# Patient Record
Sex: FEMALE | Race: WHITE | Employment: UNEMPLOYED | URBAN - METROPOLITAN AREA
[De-identification: names, ages, dates, MRNs, and addresses within clinical notes are randomized per-mention and may not be internally consistent; named-entity substitution may affect disease eponyms.]

---

## 2017-01-13 ENCOUNTER — TELEPHONE (OUTPATIENT)
Dept: FAMILY MEDICINE | Facility: CLINIC | Age: 57
End: 2017-01-13

## 2017-01-13 NOTE — TELEPHONE ENCOUNTER
"Vincent calling to request a Troponin and blood sugar labs. States, \"I am so worried about my heart because I have been shoveling and i have back pain and last night I was a little nauseated and my shoulder is acting up. I just need these labs because of my anxiety. Ask Dr Coronel to put the lab work in for me , he knows me.\"    When I tried to explain to the patient that this is thekind of labwork that is done stat in the ED she contributed her nausea to her hiatal hernia, her shoulder pain to tendonitis and her back pain to overdoing it shoveling. I let her know we send labs out to be resulted and that we would not have the results until Monday. The patient said, \"That's ok, at least I know they were done and I don't have to worry.\"  When I explained that the purpose of a Troponin was to see if there was a cardiac event patient replied, \"Do you think I could do jumping jacks if I was having a heart attack?\" I then asked the patient what she would need the Troponin then for and she said \"For peace of mind.\"    Please call the patient . Glendy Davis RN  "

## 2017-01-13 NOTE — TELEPHONE ENCOUNTER
Called patient. No symptoms during exercise. Reassurred. Will call next week if ongoing issues and can possibly see at [place] on Tuesday or [place] on Thursday.

## 2017-02-21 ENCOUNTER — OFFICE VISIT (OUTPATIENT)
Dept: FAMILY MEDICINE | Facility: CLINIC | Age: 57
End: 2017-02-21
Payer: COMMERCIAL

## 2017-02-21 ENCOUNTER — TELEPHONE (OUTPATIENT)
Dept: FAMILY MEDICINE | Facility: CLINIC | Age: 57
End: 2017-02-21

## 2017-02-21 VITALS — TEMPERATURE: 98.3 F

## 2017-02-21 DIAGNOSIS — R35.0 URINARY FREQUENCY: Primary | ICD-10-CM

## 2017-02-21 DIAGNOSIS — R39.89 URINARY PROBLEM: Primary | ICD-10-CM

## 2017-02-21 DIAGNOSIS — R82.90 NONSPECIFIC FINDING ON EXAMINATION OF URINE: ICD-10-CM

## 2017-02-21 DIAGNOSIS — O24.419 GESTATIONAL DIABETES MELLITUS: ICD-10-CM

## 2017-02-21 LAB
ALBUMIN UR-MCNC: NEGATIVE MG/DL
APPEARANCE UR: CLEAR
BACTERIA #/AREA URNS HPF: ABNORMAL /HPF
BILIRUB UR QL STRIP: NEGATIVE
COLOR UR AUTO: YELLOW
GLUCOSE UR STRIP-MCNC: NEGATIVE MG/DL
HGB UR QL STRIP: NEGATIVE
KETONES UR STRIP-MCNC: NEGATIVE MG/DL
LEUKOCYTE ESTERASE UR QL STRIP: ABNORMAL
NITRATE UR QL: NEGATIVE
NON-SQ EPI CELLS #/AREA URNS LPF: ABNORMAL /LPF
PH UR STRIP: 6 PH (ref 5–7)
RBC #/AREA URNS AUTO: ABNORMAL /HPF (ref 0–2)
SP GR UR STRIP: 1.02 (ref 1–1.03)
URN SPEC COLLECT METH UR: ABNORMAL
UROBILINOGEN UR STRIP-ACNC: 0.2 EU/DL (ref 0.2–1)
WBC #/AREA URNS AUTO: ABNORMAL /HPF (ref 0–2)

## 2017-02-21 PROCEDURE — 81001 URINALYSIS AUTO W/SCOPE: CPT | Performed by: FAMILY MEDICINE

## 2017-02-21 PROCEDURE — 87086 URINE CULTURE/COLONY COUNT: CPT | Performed by: FAMILY MEDICINE

## 2017-02-21 PROCEDURE — 99207 ZZC NO CHARGE NURSE ONLY: CPT

## 2017-02-21 NOTE — NURSING NOTE
"Initial Temp 98.3  F (36.8  C) (Tympanic)  LMP 04/05/2013 Estimated body mass index is 28.96 kg/(m^2) as calculated from the following:    Height as of 10/14/16: 5' 5\" (1.651 m).    Weight as of 10/14/16: 174 lb (78.9 kg). .    "

## 2017-02-21 NOTE — TELEPHONE ENCOUNTER
Patient called and want to come in to drop off urine sample, she has been up all night urinating and has discomfort in her lower left side.    Ann-Marie Osuna [facility]

## 2017-02-21 NOTE — TELEPHONE ENCOUNTER
Patient calling with dysuria and frequency. She would like a UA done. She would also like a diabetes check and someone to look at her ear. Does not want her B/P checked due to anxiety.    The patient will come in for a UA and wants to be called with the results. She would like a prescription sent to [facility] [place] if positive. Glendy Davis RN

## 2017-02-21 NOTE — TELEPHONE ENCOUNTER
Can do this although, if the blood sugar is high enough to cause urinary frequency, it will be seen in the urine analysis... And it wasn't.

## 2017-02-21 NOTE — MR AVS SNAPSHOT
After Visit Summary   2/21/2017    Vincent Nelson    MRN: [id number]           Patient Information     Date Of Birth          1960        Visit Information        Provider Department      2/21/2017 10:30 AM [facility], Fl [facility]        Today's Diagnoses     Urinary problem    -  1    Nonspecific finding on examination of urine           Follow-ups after your visit        Who to contact     Normal or non-critical lab and imaging results will be communicated to you by Deedhart, letter or phone within 4 business days after the clinic has received the results. If you do not hear from us within 7 days, please contact the clinic through Deedhart or phone. If you have a critical or abnormal lab result, we will notify you by phone as soon as possible.  Submit refill requests through Kymab or call your pharmacy and they will forward the refill request to us. Please allow 3 business days for your refill to be completed.          If you need to speak with a  for additional information , please call: [phone]           Additional Information About Your Visit        DeedharBidModo Information     Kymab gives you secure access to your electronic health record. If you see a primary care provider, you can also send messages to your care team and make appointments. If you have questions, please call your primary care clinic.  If you do not have a primary care provider, please call [phone] and they will assist you.        Care EveryWhere ID     This is your Care EveryWhere ID. This could be used by other organizations to access your Standard medical records  [id number]        Your Vitals Were     Temperature Last Period                98.3  F (36.8  C) (Tympanic) 04/05/2013           Blood Pressure from Last 3 Encounters:   10/14/16 128/71   09/20/16 127/75   05/06/14 118/75    Weight from Last 3 Encounters:   10/14/16 174 lb (78.9 kg)   09/20/16 178 lb (80.7 kg)   05/06/14 175  lb (79.4 kg)              We Performed the Following     *UA reflex to Microscopic and Culture ([facility] (except [place] and [place])     Urine Culture Aerobic Bacterial     Urine Microscopic        Primary Care Provider Office Phone # Fax #    Killian Coronel [phone]       [facility] [address]  [place] [address]        Thank you!     Thank you for choosing [facility]  for your care. Our goal is always to provide you with excellent care. Hearing back from our patients is one way we can continue to improve our services. Please take a few minutes to complete the written survey that you may receive in the mail after your visit with us. Thank you!             Your Updated Medication List - Protect others around you: Learn how to safely use, store and throw away your medicines at www.disposemymeds.org.          This list is accurate as of: 2/21/17 11:59 PM.  Always use your most recent med list.                   Brand Name Dispense Instructions for use    clonazePAM 0.5 MG tablet    klonoPIN    10 tablet    Take 0.5-1 tablets (0.25-0.5 mg) by mouth 2 times daily as needed for anxiety       hydrocortisone 0.2 % cream    WESTCORT    60 g    Apply sparingly to affected area three times daily as needed.       Vitamin D-3 1000 UNITS Caps      Take 1,000 Units by mouth daily

## 2017-02-21 NOTE — TELEPHONE ENCOUNTER
Vincent came in to do a UA today because she said she was up all night going to the bathroom. She now feels like things are not that bad and doesn't want to take an antibiotic if she doesn't have to. Her urine was sent for culture. Please review. DShould we just wait for the culture? She also wanted ana A1C done just as a lab visit. If this is ok please place order. Thanks, Glendy Davis RN

## 2017-02-21 NOTE — TELEPHONE ENCOUNTER
Pt was advised that urine does not show any infection, however she is still wanting to know if she can get her blood sugars checked? because she is still going to the bathroom way more than she should and feels chills down in her private parts and feels tired.  . Please triage    Yohana Crooks, Station

## 2017-02-22 NOTE — TELEPHONE ENCOUNTER
I called Vincent and let her know she could have the lab tests done that she wanted. Dr Coronel approved the A1C. Glendy Davis RN

## 2017-02-22 NOTE — TELEPHONE ENCOUNTER
I havent called Vincent yet but she specifically requested an A1C. She claims that since she had gestational diabetes while pregnant that she will get diabetes sooner or later. Do yo want to speak with her? Just order the A1C> Or I can try explaining but she wants everything sent to you. Glendy Davis RN

## 2017-02-23 LAB
BACTERIA SPEC CULT: NORMAL
MICRO REPORT STATUS: NORMAL
SPECIMEN SOURCE: NORMAL

## 2017-02-25 NOTE — PROGRESS NOTES
Ms. Nelson,    Your urine culture showed a mixture of bacteria that usually indicates a contamination from surrounding skin.  No other abnormalities are noted.  If you have ongoing issue, let me know.     Please contact the clinic if you have additional questions.  Thank you.    Sincerely,    Lionel Coronel MD

## 2017-02-28 DIAGNOSIS — O24.419 GESTATIONAL DIABETES MELLITUS: ICD-10-CM

## 2017-02-28 DIAGNOSIS — R35.0 URINARY FREQUENCY: ICD-10-CM

## 2017-02-28 LAB
GLUCOSE SERPL-MCNC: 100 MG/DL (ref 70–99)
HBA1C MFR BLD: 5.2 % (ref 4.3–6)

## 2017-02-28 PROCEDURE — 83036 HEMOGLOBIN GLYCOSYLATED A1C: CPT | Performed by: FAMILY MEDICINE

## 2017-02-28 PROCEDURE — 36415 COLL VENOUS BLD VENIPUNCTURE: CPT | Performed by: FAMILY MEDICINE

## 2017-02-28 PROCEDURE — 82947 ASSAY GLUCOSE BLOOD QUANT: CPT | Performed by: FAMILY MEDICINE

## 2017-04-17 ENCOUNTER — OFFICE VISIT (OUTPATIENT)
Dept: FAMILY MEDICINE | Facility: CLINIC | Age: 57
End: 2017-04-17
Payer: COMMERCIAL

## 2017-04-17 ENCOUNTER — RADIANT APPOINTMENT (OUTPATIENT)
Dept: GENERAL RADIOLOGY | Facility: CLINIC | Age: 57
End: 2017-04-17
Attending: FAMILY MEDICINE
Payer: COMMERCIAL

## 2017-04-17 VITALS — HEIGHT: 65 IN | SYSTOLIC BLOOD PRESSURE: 130 MMHG | DIASTOLIC BLOOD PRESSURE: 73 MMHG

## 2017-04-17 DIAGNOSIS — S99.922S: ICD-10-CM

## 2017-04-17 DIAGNOSIS — M77.11 LATERAL EPICONDYLITIS OF RIGHT ELBOW: ICD-10-CM

## 2017-04-17 DIAGNOSIS — M75.81 RIGHT ROTATOR CUFF TENDINITIS: ICD-10-CM

## 2017-04-17 DIAGNOSIS — M20.42 HAMMER TOE OF LEFT FOOT: ICD-10-CM

## 2017-04-17 DIAGNOSIS — N95.1 MENOPAUSAL SYNDROME (HOT FLASHES): ICD-10-CM

## 2017-04-17 DIAGNOSIS — G47.20 DISRUPTED SLEEP-WAKE CYCLE: ICD-10-CM

## 2017-04-17 DIAGNOSIS — R03.0 ELEVATED BLOOD PRESSURE READING WITHOUT DIAGNOSIS OF HYPERTENSION: ICD-10-CM

## 2017-04-17 DIAGNOSIS — F51.8 DISRUPTED SLEEP-WAKE CYCLE: ICD-10-CM

## 2017-04-17 DIAGNOSIS — F41.9 ANXIETY: Primary | ICD-10-CM

## 2017-04-17 DIAGNOSIS — E66.3 OVERWEIGHT (BMI 25.0-29.9): ICD-10-CM

## 2017-04-17 PROCEDURE — 73660 X-RAY EXAM OF TOE(S): CPT | Mod: LT

## 2017-04-17 PROCEDURE — 99215 OFFICE O/P EST HI 40 MIN: CPT | Performed by: FAMILY MEDICINE

## 2017-04-17 NOTE — PROGRESS NOTES
"  SUBJECTIVE:                                                    Vincent Nelson is a 56 year old female who presents to clinic today for the following health issues:      Pt broke toe last year and is having concerns about that.    The ASCVD Risk score ([place] DIANNE Jr, et al., 2013) failed to calculate for the following reasons:    Cannot find a previous HDL lab    Cannot find a previous total cholesterol lab     PROBLEMS TO ADD ON...  1. Anxiety    2. Injury of left toe, sequela    3. Menopausal syndrome (hot flashes)    4. Overweight (BMI 25.0-29.9)    5. Elevated blood pressure reading without diagnosis of hypertension    6. Hammer toe of left foot    7. Disrupted sleep-wake cycle    8. Right rotator cuff tendinitis    9. Lateral epicondylitis of right elbow         Problem list and histories reviewed & adjusted, as indicated.  Additional history: as documented        Reviewed and updated as needed this visit by clinical staff  Tobacco  Allergies  Meds  Med Hx  Surg Hx  Fam Hx  Soc Hx      Reviewed and updated as needed this visit by Provider         1. Anxiety    2. Injury of left toe, sequela    3. Menopausal syndrome (hot flashes)    4. Overweight (BMI 25.0-29.9)    5. Elevated blood pressure reading without diagnosis of hypertension    6. Hammer toe of left foot    7. Disrupted sleep-wake cycle    8. Right rotator cuff tendinitis    9. Lateral epicondylitis of right elbow        PMH: Updated and/or reviewed in chart.    PSH: Updated and/or reviewed in chart.    Family History: Updated and/or reviewed in chart.     ROS:  Constitutional, HEENT, cardiovascular, pulmonary, gi and gu systems are otherwise negative.    OBJECTIVE:                                                    /73  Ht 5' 5.25\" (1.657 m)  LMP 04/05/2013  Breastfeeding? No  PSYCH: Alert. Cooperative.  Mildly agitated.  Mood is anxious and affect is consistant.  Denies suicidal or homicidal ideation.  EXTREM: hammer deformity on left 2nd toe. "     XRay: Negative.    Results for orders placed or performed in visit on 04/17/17   XR Toe Left G/E 2 Views    Narrative    TOE LEFT TWO OR MORE VIEWS April 17, 2017 7:19 PM     HISTORY: Unspecified injury of left foot, sequela.    COMPARISON: None.      Impression    IMPRESSION: Bones are normally aligned. No acute fracture. Relative  lucency is noted at the distal fifth metatarsal medially as well as  the proximal medial fifth phalanx. This may be artifact. I do not  suspect acute fracture. Given no specific complaints at this location,  possibility of osteomyelitis would be low without obvious clinical  concern. Most specifically, no fracture involving the great toe.      ASSESSMENT/PLAN:                                                        ICD-10-CM    1. Anxiety F41.9    2. Injury of left toe, sequela - no evidence of traumatic deformity.  S99.922S XR Toe Left G/E 2 Views   3. Menopausal syndrome (hot flashes) - ongoing but anxiety contributinhg.  N95.1    4. Overweight (BMI 25.0-29.9) - counseled.  E66.3    5. Elevated blood pressure reading without diagnosis of hypertension - Discussed pathophysiology of this condition and implications.  Questions answered.  R03.0    6. Hammer toe of left foot - encouraged metatarsal pad. M20.42    7. Disrupted sleep-wake cycle - actually doing better.  G47.20 SLEEP EVALUATION & MANAGEMENT REFERRAL - ADULT    F51.8    8. Right rotator cuff tendinitis M75.81 [facility] PT[facility] HAND, AND CHIROPRACTIC REFERRAL   9. Lateral epicondylitis of right elbow M77.11 [facility] PT, HAND, AND CHIROPRACTIC REFERRAL       Care plan updated in chart for chronic problems.    Patient Instructions     Breathe adriana.    Weight Watchers program.    15 pounds by August is your commitment.    If you have not lost 15 pounds by august, you agree to have a sleep consult.     Buy a metatarsal pad and try it out.     The physical therapy for your shoulder and arm.    Monitor blood pressure and send results weekly.       Orders Placed This Encounter     XR Toe Left G/E 2 Views      Goals     None         30 minutes of the 50 minute appointment was spent with counseling and education and/or coordinating care with regards to above problem(s). See patients instructions for futher details.       Killian Coronel MD

## 2017-04-17 NOTE — MR AVS SNAPSHOT
After Visit Summary   4/17/2017    Vincent Nelson    MRN: [id number]           Patient Information     Date Of Birth          1960        Visit Information        Provider Department      4/17/2017 6:30 PM Killian Coronel MD [facility]        Today's Diagnoses     Injury of left toe, sequela    -  1    Anxiety        Menopausal syndrome (hot flashes)        Overweight (BMI 25.0-29.9)        Elevated blood pressure reading without diagnosis of hypertension        Hammer toe of left foot        Disrupted sleep-wake cycle        Right rotator cuff tendinitis        Lateral epicondylitis of right elbow          Care Instructions      Breathricardo wright.    Weight Watchers program.    15 pounds by August is your commitment.    If you have not lost 15 pounds by august, you agree to have a sleep consult.     Buy a metatarsal pad and try it out.     The physical therapy for your shoulder and arm.    Monitor blood pressure and send results weekly.         Follow-ups after your visit        Additional Services     [facility] PT, HAND, AND CHIROPRACTIC REFERRAL       **This order will print in the [facility] Scheduling Office**    Physical Therapy, Hand Therapy and Chiropractic Care are available through:    *[facility]  *[facility]  *[facility]    Call one number to schedule at any of the above locations: [phone].    Your provider has referred you to: Physical Therapy at [facility] or [facility]    Indication/Reason for Referral: shoulder and arm pain.  Onset of Illness: chronic  Therapy Orders: Evaluate and Treat  Special Programs:   Special Request: Home program.    Joyce Cook      Additional Comments for the Therapist or Chiropractor:     Please be aware that coverage of these services is subject to the terms and limitations of your health insurance plan.  Call member services at your health plan with any benefit or coverage questions.      Please bring  the following to your appointment:    *Your personal calendar for scheduling future appointments  *Comfortable clothing            SLEEP EVALUATION & MANAGEMENT REFERRAL - ADULT       Please be aware that coverage of these services is subject to the terms and limitations of your health insurance plan.  Call member services at your health plan with any benefit or coverage questions.      Please bring the following to your appointment:    >>   List of current medications   >>   This referral request   >>   Any documents/labs given to you for this referral    [facility] / Lab  Ph [phone] (Age 2 and up)                  Future tests that were ordered for you today     Open Future Orders        Priority Expected Expires Ordered    SLEEP EVALUATION & MANAGEMENT REFERRAL - ADULT Routine  4/17/2018 4/17/2017            Who to contact     Normal or non-critical lab and imaging results will be communicated to you by [facility] End[facility]ichart, letter or phone within 4 business days after the clinic has received the results. If you do not hear from us within 7 days, please contact the clinic through Fraktalia Studiost or phone. If you have a critical or abnormal lab result, we will notify you by phone as soon as possible.  Submit refill requests through Slyce or call your pharmacy and they will forward the refill request to us. Please allow 3 business days for your refill to be completed.          If you need to speak with a  for additional information , please call: [phone]           Additional Information About Your Visit        Slyce Information     Slyce gives you secure access to your electronic health record. If you see a primary care provider, you can also send messages to your care team and make appointments. If you have questions, please call your primary care clinic.  If you do not have a primary care provider, please call [phone] and they will assist you.        Care EveryWhere ID     This  "is your Care EveryWhere ID. This could be used by other organizations to access your [place] medical records  [id number]        Your Vitals Were     Height Last Period Breastfeeding?             5' 5.25\" (1.657 m) 04/05/2013 No          Blood Pressure from Last 3 Encounters:   04/17/17 130/73   10/14/16 128/71   09/20/16 127/75    Weight from Last 3 Encounters:   10/14/16 174 lb (78.9 kg)   09/20/16 178 lb (80.7 kg)   05/06/14 175 lb (79.4 kg)              We Performed the Following     [facility] PT, HAND, AND CHIROPRACTIC REFERRAL        Primary Care Provider Office Phone # Fax #    Killian Coronel [phone]       [facility] [address]  [place] [address]        Thank you!     Thank you for choosing [facility]  for your care. Our goal is always to provide you with excellent care. Hearing back from our patients is one way we can continue to improve our services. Please take a few minutes to complete the written survey that you may receive in the mail after your visit with us. Thank you!             Your Updated Medication List - Protect others around you: Learn how to safely use, store and throw away your medicines at www.disposemymeds.org.          This list is accurate as of: 4/17/17  7:50 PM.  Always use your most recent med list.                   Brand Name Dispense Instructions for use    clonazePAM 0.5 MG tablet    klonoPIN    10 tablet    Take 0.5-1 tablets (0.25-0.5 mg) by mouth 2 times daily as needed for anxiety       hydrocortisone 0.2 % cream    WESTCORT    60 g    Apply sparingly to affected area three times daily as needed.       Vitamin D-3 1000 UNITS Caps      Take 1,000 Units by mouth daily Reported on 4/17/2017         "

## 2017-04-18 ENCOUNTER — MYC MEDICAL ADVICE (OUTPATIENT)
Dept: FAMILY MEDICINE | Facility: CLINIC | Age: 57
End: 2017-04-18

## 2017-04-18 NOTE — PATIENT INSTRUCTIONS
Breathe rites.    Weight Watchers program.    15 pounds by August is your commitment.    If you have not lost 15 pounds by august, you agree to have a sleep consult.     Buy a metatarsal pad and try it out.     The physical therapy for your shoulder and arm.    Monitor blood pressure and send results weekly.

## 2017-05-04 ENCOUNTER — TELEPHONE (OUTPATIENT)
Dept: FAMILY MEDICINE | Facility: CLINIC | Age: 57
End: 2017-05-04

## 2017-05-04 NOTE — TELEPHONE ENCOUNTER
Panel Management Review      Patient has the following on her problem list:     Hypertension   Last three blood pressure readings:  BP Readings from Last 3 Encounters:   04/17/17 130/73   10/14/16 128/71   09/20/16 127/75     Blood pressure: Passed    HTN Guidelines:  Age 18-59 BP range:  Less than 140/90  Age 60-85 with Diabetes:  Less than 140/90  Age 60-85 without Diabetes:  less than 150/90      Composite cancer screening  Chart review shows that this patient is due/due soon for the following Fecal Colorectal (FIT)  Summary:    Patient is due/failing the following:   FIT, advanced directive, mammo due in August    Action needed:   Give advanced directive info  FIT was mailed to her in 12/2016  Referral for mammo due in August    Type of outreach:    Sent afterBOT message.    Questions for provider review:    None                                                                                                                                    Maral VARELA       Chart routed to none .

## 2017-05-16 ENCOUNTER — TELEPHONE (OUTPATIENT)
Dept: FAMILY MEDICINE | Facility: CLINIC | Age: 57
End: 2017-05-16

## 2017-05-16 DIAGNOSIS — Z12.31 ENCOUNTER FOR SCREENING MAMMOGRAM FOR BREAST CANCER: Primary | ICD-10-CM

## 2017-05-16 NOTE — TELEPHONE ENCOUNTER
Patient called and would like you to put in a mammogram referral for [facility].    Thank you  Ann-Marie Osuna [facility]

## 2017-05-17 NOTE — TELEPHONE ENCOUNTER
Pt will call back with a fax number to send referral to [facility].     Yohana Crooks, Station

## 2017-05-19 ENCOUNTER — TRANSFERRED RECORDS (OUTPATIENT)
Dept: HEALTH INFORMATION MANAGEMENT | Facility: CLINIC | Age: 57
End: 2017-05-19

## 2017-06-14 ENCOUNTER — MYC MEDICAL ADVICE (OUTPATIENT)
Dept: FAMILY MEDICINE | Facility: CLINIC | Age: 57
End: 2017-06-14

## 2017-06-14 ENCOUNTER — TELEPHONE (OUTPATIENT)
Dept: FAMILY MEDICINE | Facility: CLINIC | Age: 57
End: 2017-06-14

## 2017-06-14 DIAGNOSIS — R35.0 URINARY FREQUENCY: Primary | ICD-10-CM

## 2017-06-14 DIAGNOSIS — N30.00 ACUTE CYSTITIS WITHOUT HEMATURIA: Primary | ICD-10-CM

## 2017-06-14 DIAGNOSIS — R35.0 URINARY FREQUENCY: ICD-10-CM

## 2017-06-14 LAB
ALBUMIN UR-MCNC: NEGATIVE MG/DL
APPEARANCE UR: ABNORMAL
BACTERIA #/AREA URNS HPF: ABNORMAL /HPF
BILIRUB UR QL STRIP: NEGATIVE
COLOR UR AUTO: YELLOW
GLUCOSE UR STRIP-MCNC: NEGATIVE MG/DL
HGB UR QL STRIP: NEGATIVE
KETONES UR STRIP-MCNC: NEGATIVE MG/DL
LEUKOCYTE ESTERASE UR QL STRIP: ABNORMAL
NITRATE UR QL: NEGATIVE
NON-SQ EPI CELLS #/AREA URNS LPF: ABNORMAL /LPF
PH UR STRIP: 5.5 PH (ref 5–7)
RBC #/AREA URNS AUTO: ABNORMAL /HPF (ref 0–2)
SP GR UR STRIP: 1.02 (ref 1–1.03)
URN SPEC COLLECT METH UR: ABNORMAL
UROBILINOGEN UR STRIP-ACNC: 0.2 EU/DL (ref 0.2–1)
WBC #/AREA URNS AUTO: ABNORMAL /HPF (ref 0–2)

## 2017-06-14 PROCEDURE — 87086 URINE CULTURE/COLONY COUNT: CPT | Performed by: FAMILY MEDICINE

## 2017-06-14 PROCEDURE — 81001 URINALYSIS AUTO W/SCOPE: CPT | Performed by: FAMILY MEDICINE

## 2017-06-14 PROCEDURE — 87088 URINE BACTERIA CULTURE: CPT | Performed by: FAMILY MEDICINE

## 2017-06-14 RX ORDER — SULFAMETHOXAZOLE/TRIMETHOPRIM 800-160 MG
1 TABLET ORAL 2 TIMES DAILY
Qty: 6 TABLET | Refills: 0 | Status: SHIPPED | OUTPATIENT
Start: 2017-06-14 | End: 2017-06-17

## 2017-06-14 NOTE — PROGRESS NOTES
Urine analysis consistent with urinary tract infection.     MyChart message but please check Thursday that patient got message.

## 2017-06-14 NOTE — TELEPHONE ENCOUNTER
Pt is calling and states that she thinks she has a UTI and wants to know if Dr Coronel would just let her come in and leave a urine for lab , since he knows her so well. She has been having frequency every 3 hours not sleeping.   Please triage.     Yohana Crooks, Station

## 2017-06-14 NOTE — TELEPHONE ENCOUNTER
Consulted with Dr Coronel. She can come in and leave a urine. I placed orders. Please let her know and make her a lab appointment.Thanks! Glendy Davis RN

## 2017-06-15 NOTE — TELEPHONE ENCOUNTER
Called and reassured pt that it is ok for her to take her Vit 3 with her antibiotic   May call back as need   [facility]  RN/Mino Herrera

## 2017-06-15 NOTE — TELEPHONE ENCOUNTER
Pt is calling and asking for a nurse to call her back in regards to if her antibiotic , can she take it with her vitamin D three, does it mix ok? If she doesn't answer she just wants you to leave an aswer on her phone bradley Crooks, Station [place]

## 2017-06-17 LAB
BACTERIA SPEC CULT: ABNORMAL
MICRO REPORT STATUS: ABNORMAL
SPECIMEN SOURCE: ABNORMAL

## 2017-06-17 NOTE — PROGRESS NOTES
Ms. Nelson,    Your urine culture showed some bacteria. Hopefully the antibiotic helpd with your symptoms but let me know if they didn't.    Please contact the clinic if you have additional questions.  Thank you.    Sincerely,    Lionel Coronel MD

## 2017-06-20 ENCOUNTER — TRANSFERRED RECORDS (OUTPATIENT)
Dept: HEALTH INFORMATION MANAGEMENT | Facility: CLINIC | Age: 57
End: 2017-06-20

## 2017-06-22 ENCOUNTER — TELEPHONE (OUTPATIENT)
Dept: FAMILY MEDICINE | Facility: CLINIC | Age: 57
End: 2017-06-22

## 2017-06-22 NOTE — TELEPHONE ENCOUNTER
Reason for Call:  Other call back and prescription    Detailed comments: She is on Ceftin for a UTI.  She is having watery stools.  She can not make it to the bathroom at times.  Please advise.  [facility] [place].    Phone Number Patient can be reached at: Home number on file [phone] (home)    Best Time: any    Can we leave a detailed message on this number? YES    Call taken on 6/22/2017 at 9:21 AM by Myriam Welsh

## 2017-06-29 ENCOUNTER — TELEPHONE (OUTPATIENT)
Dept: FAMILY MEDICINE | Facility: CLINIC | Age: 57
End: 2017-06-29

## 2017-06-29 NOTE — TELEPHONE ENCOUNTER
Pt is calling and states that she has a sore throat and wants to be seen today, she wants to know if she can just be swabbed and then treated since she cant get appt today? Please call to triage.     Yohana Crooks, Station

## 2017-06-29 NOTE — TELEPHONE ENCOUNTER
Spoke with  Patient , she is on [facility] way to [facility],  To get a  Test,  Has had a scratchy throat  When she awakens for past 2-3 days, no fever, no problems swallowing   Advised good to get tested, to follow up as need   [facility]  RN/Mino Herrera

## 2017-07-12 ENCOUNTER — TRANSFERRED RECORDS (OUTPATIENT)
Dept: HEALTH INFORMATION MANAGEMENT | Facility: CLINIC | Age: 57
End: 2017-07-12

## 2017-10-31 ENCOUNTER — OFFICE VISIT (OUTPATIENT)
Dept: FAMILY MEDICINE | Facility: CLINIC | Age: 57
End: 2017-10-31
Payer: COMMERCIAL

## 2017-10-31 DIAGNOSIS — F41.9 ANXIETY: Primary | ICD-10-CM

## 2017-10-31 DIAGNOSIS — L98.9 SKIN LESION: ICD-10-CM

## 2017-10-31 PROCEDURE — 99214 OFFICE O/P EST MOD 30 MIN: CPT | Performed by: FAMILY MEDICINE

## 2017-10-31 NOTE — PROGRESS NOTES
"  SUBJECTIVE:                                                      Vincent Nelson is a 57 year old year old female who presents with the following problems;    Anxiety  26 year old daughter living with her and not ideal. Neighbor friend is final stages of cancer and stressful.  Some issues with  lately.  Thinking more about being sad.   Hard time sleeping. Wakes in the middle of the night but up and down     Scalp lesion  Recurred. Has had pilar cysts and fluid cyst in past.     Hypertension/Palpitations  On and off and blood pressure is up and down on and off.   Clonazepam on and off but rare.     PMH: Updated and/or reviewed in chart.    PSH: Updated and/or reviewed in chart.    Family History: Updated and/or reviewed in chart.    SOCIAL HX:  Updated and/or reviewed in chart. Not working. Has a job. Wondering about volunteering.      ROS:  righ tshoudler and wrist are bothering her. fall on right wrist.  Stable and functional. Constitutional, HEENT, cardiovascular, pulmonary, gi and gu systems are otherwise negative.      OBJECTIVE:                                                      [facility] 04/05/2013  Estimated body mass index is 28.96 kg/(m^2) as calculated from the following:    Height as of 10/14/16: 5' 5\" (1.651 m).    Weight as of 10/14/16: 174 lb (78.9 kg).   Refused vital signs.   GENERAL: Pleasant and interactive.  Alert and oriented x 3.  No acute distress.  PSYCH: Alert. Cooperative.  Mildly agitated.  Mood is anxious and affect is consistant.  Denies suicidal or homicidal ideation.  SKIN: small nodule on top of scalp. Non-tender. Subcutaneous.      ASSESSMENT:                                                      1. Anxiety    2. Skin lesion          PLAN:                                                      No orders of the defined types were placed in this encounter.     I discussed the potential side effects of antianxiety medications as well as the liklihood of worsening before improvement over the " next few weeks. I reemphasized the importance of a multi-armed approach towards the treatment of anxiety including regular exercise, adequate sleep, and a well rounded, low-glycemic diet.  In addition, I emphasized the importance of ongoing development of her support network. If new or worsening symptoms, she will seek help immediately.     Again, discussed selective serotonin uptake inhibitor or serotonin and norepinephrine reuptake inhibitor and encouraged her to consider using one of these.  She is still hesitant to do this..     20 minutes of the 30 minute appointment was spent with counseling and education and/or coordinating care with regards to above problem(s). See patients instructions for futher details.     There are no Patient Instructions on file for this visit.         The assessment and plan was reviewed with the patient (or parent) and questions were answered.  They were instructed to contact the office if new or concerning issues occur.

## 2017-10-31 NOTE — MR AVS SNAPSHOT
After Visit Summary   10/31/2017    Vincent Nelson    MRN: [id number]           Patient Information     Date Of Birth          1960        Visit Information        Provider Department      10/31/2017 2:30 PM Killian Coronel MD [facility]        Today's Diagnoses     Anxiety    -  1    Skin lesion           Follow-ups after your visit        Your next 10 appointments already scheduled     Nov 02, 2017  2:30 PM CDT   Office Visit with Killian Coronel MD   [facility]    [address] [address] [phone]           Bring a current list of meds and any records pertaining to this visit. For Physicals, please bring immunization records and any forms needing to be filled out. Please arrive 10 minutes early to complete paperwork.            Dec 13, 2017  3:30 PM CST   Office Visit with Killian Coronel MD   [facility]    [address] [address] [phone]           Bring a current list of meds and any records pertaining to this visit. For Physicals, please bring immunization records and any forms needing to be filled out. Please arrive 10 minutes early to complete paperwork.              Who to contact     Normal or non-critical lab and imaging results will be communicated to you by MyChart, letter or phone within 4 business days after the clinic has received the results. If you do not hear from us within 7 days, please contact the clinic through MyChart or phone. If you have a critical or abnormal lab result, we will notify you by phone as soon as possible.  Submit refill requests through [employer] or call your pharmacy and they will forward the refill request to us. Please allow 3 business days for your refill to be completed.          If you need to speak with a  for additional information , please call: [phone]            Additional Information About Your Visit        [employer] Information     [employer] gives you secure access to your electronic health record. If you see a primary care provider, you can also send messages to your care team and make appointments. If you have questions, please call your primary care clinic.  If you do not have a primary care provider, please call [phone] and they will assist you.        Care EveryWhere ID     This is your Care EveryWhere ID. This could be used by other organizations to access your [facility] medical records  [id number]        Your Vitals Were     Last Period                   04/05/2013            Blood Pressure from Last 3 Encounters:   04/17/17 130/73   10/14/16 128/71   09/20/16 127/75    Weight from Last 3 Encounters:   10/14/16 174 lb (78.9 kg)   09/20/16 178 lb (80.7 kg)   05/06/14 175 lb (79.4 kg)              Today, you had the following     No orders found for display       Primary Care Provider Office Phone # Fax #    Killian Coronel [phone]       [address]  [place] [address]        Equal Access to Services     [facility]: Hadii aad ku hadasho Soomaali, waaxda luqadaha, qaybta kaalmada adeegyada, awais west haygucci zelaya . So [facility] [phone].    ATENCIÓN: Si habla español, tiene a valdes disposición servicios gratuitos de asistencia lingüística. Ed al [phone].    We comply with applicable federal civil rights laws and [place] laws. We do not discriminate on the basis of race, color, national origin, age, disability, sex, sexual orientation, or gender identity.            Thank you!     Thank you for choosing [facility]  for your care. Our goal is always to provide you with excellent care. Hearing back from our patients is one way we can continue to improve our services. Please take a few minutes to complete the written survey that you may receive in the mail after your visit with us. Thank  you!             Your Updated Medication List - Protect others around you: Learn how to safely use, store and throw away your medicines at www.disposemymeds.org.          This list is accurate as of: 10/31/17 11:59 PM.  Always use your most recent med list.                   Brand Name Dispense Instructions for use Diagnosis    cefuroxime 250 MG tablet    CEFTIN    10 tablet    Take 1 tablet (250 mg) by mouth 2 times daily    Acute cystitis without hematuria       clonazePAM 0.5 MG tablet    klonoPIN    10 tablet    Take 0.5-1 tablets (0.25-0.5 mg) by mouth 2 times daily as needed for anxiety    Anxiety, Flying phobia       hydrocortisone 0.2 % cream    WESTCORT    60 g    Apply sparingly to affected area three times daily as needed.    Rash       Vitamin D-3 1000 UNITS Caps      Take 1,000 Units by mouth daily Reported on 4/17/2017

## 2017-12-13 ENCOUNTER — OFFICE VISIT (OUTPATIENT)
Dept: FAMILY MEDICINE | Facility: CLINIC | Age: 57
End: 2017-12-13
Payer: COMMERCIAL

## 2017-12-13 DIAGNOSIS — F40.243 FLYING PHOBIA: ICD-10-CM

## 2017-12-13 DIAGNOSIS — L72.11 PILAR CYST: ICD-10-CM

## 2017-12-13 DIAGNOSIS — F41.9 ANXIETY: Primary | ICD-10-CM

## 2017-12-13 PROCEDURE — 99212 OFFICE O/P EST SF 10 MIN: CPT | Mod: 25 | Performed by: FAMILY MEDICINE

## 2017-12-13 PROCEDURE — 10060 I&D ABSCESS SIMPLE/SINGLE: CPT | Performed by: FAMILY MEDICINE

## 2017-12-13 RX ORDER — CLONAZEPAM 0.5 MG/1
0.25-0.5 TABLET ORAL 2 TIMES DAILY PRN
Qty: 10 TABLET | Refills: 0 | Status: SHIPPED | OUTPATIENT
Start: 2017-12-13

## 2017-12-13 NOTE — MR AVS SNAPSHOT
After Visit Summary   12/13/2017    Vincent Nelson    MRN: [id number]           Patient Information     Date Of Birth          1960        Visit Information        Provider Department      12/13/2017 3:30 PM Killian Coronel MD [facility]        Today's Diagnoses     Anxiety    -  1    Flying phobia        Pilar cyst           Follow-ups after your visit        Your next 10 appointments already scheduled     Dec 22, 2017  3:30 PM CST   SHORT with Killian Coronel MD   [facility]    [address] [place] [id number] [phone]              Who to contact     Normal or non-critical lab and imaging results will be communicated to you by MyChart, letter or phone within 4 business days after the clinic has received the results. If you do not hear from us within 7 days, please contact the clinic through MyChart or phone. If you have a critical or abnormal lab result, we will notify you by phone as soon as possible.  Submit refill requests through [facility] or call your pharmacy and they will forward the refill request to us. Please allow 3 business days for your refill to be completed.          If you need to speak with a  for additional information , please call: [phone]           Additional Information About Your Visit        [employer] Information     [facility] gives you secure access to your electronic health record. If you see a primary care provider, you can also send messages to your care team and make appointments. If you have questions, please call your primary care clinic.  If you do not have a primary care provider, please call [phone] and they will assist you.        Care EveryWhere ID     This is your Care EveryWhere ID. This could be used by other organizations to access your [facility] medical records  [id number]        Your Vitals Were     Last Period                   04/05/2013             Blood Pressure from Last 3 Encounters:   04/17/17 130/73   10/14/16 128/71   09/20/16 127/75    Weight from Last 3 Encounters:   10/14/16 174 lb (78.9 kg)   09/20/16 178 lb (80.7 kg)   05/06/14 175 lb (79.4 kg)              We Performed the Following     DRAIN SKIN ABSCESS SIMPLE/SINGLE          Today's Medication Changes          These changes are accurate as of: 12/13/17  4:50 PM.  If you have any questions, ask your nurse or doctor.               Stop taking these medicines if you haven't already. Please contact your care team if you have questions.     cefuroxime 250 MG tablet   Commonly known as:  CEFTIN   Stopped by:  Killian Coronel MD                Where to get your medicines      Some of these will need a paper prescription and others can be bought over the counter.  Ask your nurse if you have questions.     Bring a paper prescription for each of these medications     clonazePAM 0.5 MG tablet                Primary Care Provider Office Phone # Fax #    Killian Coronel [phone] [address] [facility] [address]        Equal Access to Services     [facility]: Hadii lindsey ku hadasho Soomaali, waaxda luqadaha, qaybta kaalmada adesandipyadenice, awais zelaya . So [facility] [phone].    ATENCIÓN: Si habla español, tiene a valdes disposición servicios gratuitos de asistencia lingüística. [facility] [phone].    We comply with applicable federal civil rights laws and [place] laws. We do not discriminate on the basis of race, color, national origin, age, disability, sex, sexual orientation, or gender identity.            Thank you!     Thank you for choosing [facility]  for your care. Our goal is always to provide you with excellent care. Hearing back from our patients is one way we can continue to improve our services. Please take a few minutes to complete the written survey that you may receive in the mail after your visit with us. Thank  you!             Your Updated Medication List - Protect others around you: Learn how to safely use, store and throw away your medicines at www.disposemymeds.org.          This list is accurate as of: 12/13/17  4:50 PM.  Always use your most recent med list.                   Brand Name Dispense Instructions for use Diagnosis    clonazePAM 0.5 MG tablet    klonoPIN    10 tablet    Take 0.5-1 tablets (0.25-0.5 mg) by mouth 2 times daily as needed for anxiety    Anxiety, Flying phobia       hydrocortisone 0.2 % cream    WESTCORT    60 g    Apply sparingly to affected area three times daily as needed.    Rash       Vitamin D-3 1000 UNITS Caps      Take 1,000 Units by mouth daily Reported on 4/17/2017

## 2017-12-13 NOTE — NURSING NOTE
"Chief Complaint   Patient presents with     Derm Problem       Initial LMP 04/05/2013 Estimated body mass index is 28.96 kg/(m^2) as calculated from the following:    Height as of 10/14/16: 5' 5\" (1.651 m).    Weight as of 10/14/16: 174 lb (78.9 kg).  Medication Reconciliation: complete     Jenna Sinclair CMA (AAMA)      "

## 2017-12-13 NOTE — PROGRESS NOTES
SUBJECTIVE:   Vincent Nelson is a 57 year old female who presents to clinic today for the following health issues:  *Patient refused weight and blood pressure. States that she did take blood pressure 130/70 and then 113/66.     Cyst - Patient has a cyst on the top of her head that she states has had one removed in the same area about a year and a half ago. Has been dealing with this cyst for about 8 months now. Can be painful at times, no signs of infection that she is aware of.    Anxiety: Doing fairly well.  Would like to have a refill of her clonazepam.  Still does not want to use SNR eyes or SSRIs.  Trying to stay active.    Problem list and histories reviewed & adjusted, as indicated.  Additional history: as documented        Reviewed and updated as needed this visit by clinical staff     Reviewed and updated as needed this visit by Provider         1. Anxiety    2. Flying phobia    3. Pilar cyst        PMH: Updated and/or reviewed in chart.    PSH: Updated and/or reviewed in chart.    Family History: Updated and/or reviewed in chart.     ROS:  Constitutional, HEENT, cardiovascular, pulmonary, gi and gu systems are otherwise negative.    OBJECTIVE:                                                    LMP 04/05/2013  General is pleasant and interactive no acute distress.  Examination of her head shows that she has a small approximately 2-3 mm lump on the crown of her scalp.  There is a nearby scar.  It is nontender noninflamed.    Procedure after getting obtaining informed consent cleansed with Betadine solution and injected and injected with 1% lidocaine without epinephrine at patient's request.  Using a 15 blade I made a 4 mm incision over the cyst.  A small amount of clear to bloody fluid came out.  The cyst collapsed on itself.  No cyst wall was identified.  The tissue was cleansed and cleaned and irrigated with small amount lidocaine and then closed with a single 4-0 interrupted suture.  Patient tolerated  procedure without difficulty.    Results for orders placed or performed in visit on 06/14/17   UA with Microscopic reflex to Culture   Result Value Ref Range    Color Urine Yellow     Appearance Urine Slightly Cloudy     Glucose Urine Negative NEG mg/dL    Bilirubin Urine Negative NEG    Ketones Urine Negative NEG mg/dL    Specific Gravity Urine 1.025 1.003 - 1.035    pH Urine 5.5 5.0 - 7.0 pH    Protein Albumin Urine Negative NEG mg/dL    Urobilinogen Urine 0.2 0.2 - 1.0 EU/dL    Nitrite Urine Negative NEG    Blood Urine Negative NEG    Leukocyte Esterase Urine Large (A) NEG    Source Midstream Urine     WBC Urine 5-10 (A) 0 - 2 /HPF    RBC Urine O - 2 0 - 2 /HPF    Squamous Epithelial /LPF Urine Few FEW /LPF    Bacteria Urine Few (A) NEG /HPF   Urine Culture Aerobic Bacterial   Result Value Ref Range    Specimen Description Midstream Urine     Culture Micro (A)      50,000 to 100,000 colonies/mL Streptococcus agalactiae sero group B Beta   Hemolytic Streptococcus groups A and B are susceptible to ampicillin,   penicillin, vancomycin, and the cephalosporins.  Susceptibility testing is not   routinely done on these organisms isolated from urine.  10,000 to 50,000 colonies/mL mixed urogenital lobo      Micro Report Status FINAL 06/17/2017       ASSESSMENT/PLAN:                                                        ICD-10-CM    1. 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 Pilar cyst L72.11 DRAIN SKIN ABSCESS SIMPLE/SINGLE         There are no Patient Instructions on file for this visit.   Orders Placed This Encounter     DRAIN SKIN ABSCESS SIMPLE/SINGLE     clonazePAM (KLONOPIN) 0.5 MG tablet      BP Readings from Last 1 Encounters:   04/17/17 130/73      Wt Readings from Last 1 Encounters:   10/14/16 174 lb (78.9 kg)      Wound care discussed.  May get wet but should not submerge.  Sutures out in 9 days.  Discussed as mentioned prior to the  the procedure I told her that this could recur as though it was very small and is removing the cyst wall would be difficult.  She will monitor for recurrence or problems.    See Patient Instructions    Killian Coronel MD

## 2017-12-22 ENCOUNTER — OFFICE VISIT (OUTPATIENT)
Dept: FAMILY MEDICINE | Facility: CLINIC | Age: 57
End: 2017-12-22
Payer: COMMERCIAL

## 2017-12-22 ENCOUNTER — TELEPHONE (OUTPATIENT)
Dept: FAMILY MEDICINE | Facility: CLINIC | Age: 57
End: 2017-12-22

## 2017-12-22 VITALS — TEMPERATURE: 98.2 F

## 2017-12-22 DIAGNOSIS — R05.9 COUGH: Primary | ICD-10-CM

## 2017-12-22 DIAGNOSIS — Z48.02 VISIT FOR SUTURE REMOVAL: ICD-10-CM

## 2017-12-22 PROCEDURE — 99212 OFFICE O/P EST SF 10 MIN: CPT | Mod: 24 | Performed by: FAMILY MEDICINE

## 2017-12-22 NOTE — PATIENT INSTRUCTIONS
Call us if the cough is getting worse and we could consider using an inhaler or antibiotic.     If not going away after a month, come back.

## 2017-12-22 NOTE — PROGRESS NOTES
SUBJECTIVE:   Vincent Nelson is a 57 year old female who presents to clinic today for the following health issues:    * Ongoing cough but not getting worse. No fevers. No short of breath. [place]. Has been at least 2-3 weeks with the cough.     * suture removal on scalp        Problem list and histories reviewed & adjusted, as indicated.  Additional history: as documented        Reviewed and updated as needed this visit by clinical staff  Tobacco  Allergies  Meds  Med Hx  Surg Hx  Fam Hx  Soc Hx      Reviewed and updated as needed this visit by Provider         1. Cough    2. Visit for suture removal        PMH: Updated and/or reviewed in chart.    PSH: Updated and/or reviewed in chart.    Family History: Updated and/or reviewed in chart.     ROS:  Constitutional, HEENT, cardiovascular, pulmonary, gi and gu systems are otherwise negative.    OBJECTIVE:                                                    Temp 98.2  F (36.8  C) (Tympanic)  LMP 04/05/2013  Breastfeeding? No  GENERAL: Pleasant and interactive.  Alert and oriented x 3.  No acute distress.  PSYCH: Alert and oriented times 3; coherent speech, normal rate and volume, able to articulate logical thoughts, able to abstract reason, no tangential thoughts, no hallucinations or delusions  Her affect is normal.  LUNGS: few inspiratory squeeks in the back.   HEART:  S1 and S2 normal, no murmurs, clicks, gallops or rubs. Regular rate and rhythm.         Results for orders placed or performed in visit on 06/14/17   UA with Microscopic reflex to Culture   Result Value Ref Range    Color Urine Yellow     Appearance Urine Slightly Cloudy     Glucose Urine Negative NEG mg/dL    Bilirubin Urine Negative NEG    Ketones Urine Negative NEG mg/dL    Specific Gravity Urine 1.025 1.003 - 1.035    pH Urine 5.5 5.0 - 7.0 pH    Protein Albumin Urine Negative NEG mg/dL    Urobilinogen Urine 0.2 0.2 - 1.0 EU/dL    Nitrite Urine  Negative NEG    Blood Urine Negative NEG    Leukocyte Esterase Urine Large (A) NEG    Source Midstream Urine     WBC Urine 5-10 (A) 0 - 2 /HPF    RBC Urine O - 2 0 - 2 /HPF    Squamous Epithelial /LPF Urine Few FEW /LPF    Bacteria Urine Few (A) NEG /HPF   Urine Culture Aerobic Bacterial   Result Value Ref Range    Specimen Description Midstream Urine     Culture Micro (A)      50,000 to 100,000 colonies/mL Streptococcus agalactiae sero group B Beta   Hemolytic Streptococcus groups A and B are susceptible to ampicillin,   penicillin, vancomycin, and the cephalosporins.  Susceptibility testing is not   routinely done on these organisms isolated from urine.  10,000 to 50,000 colonies/mL mixed urogenital lobo      Micro Report Status FINAL 06/17/2017       ASSESSMENT/PLAN:                                                        ICD-10-CM    1. Cough - may have had a mild walking pneumonia.  Given that she is getting better, recommended  R05    2. Visit for suture removal Z48.02          Patient Instructions     Call us if the cough is getting worse and we could consider using an inhaler or antibiotic.     If not going away after a month, come back.      No orders of the defined types were placed in this encounter.     BP Readings from Last 1 Encounters:   04/17/17 130/73      Wt Readings from Last 1 Encounters:   10/14/16 174 lb (78.9 kg)          See Patient Instructions    Killian Coronel MD

## 2017-12-22 NOTE — MR AVS SNAPSHOT
After Visit Summary   12/22/2017    Vincent Nelson    MRN: [id number]           Patient Information     Date Of Birth          1960        Visit Information        Provider Department      12/22/2017 3:30 PM Killian Coronel MD [facility]        Today's Diagnoses     Cough    -  1    Visit for suture removal          Care Instructions      Call us if the cough is getting worse and we could consider using an inhaler or antibiotic.     If not going away after a month, come back.           Follow-ups after your visit        Follow-up notes from your care team     Return if symptoms worsen or fail to improve.      Who to contact     Normal or non-critical lab and imaging results will be communicated to you by [facility], letter or phone within 4 business days after the clinic has received the results. If you do not hear from us within 7 days, please contact the clinic through [facility] or phone. If you have a critical or abnormal lab result, we will notify you by phone as soon as possible.  Submit refill requests through [facility] or call your pharmacy and they will forward the refill request to us. Please allow 3 business days for your refill to be completed.          If you need to speak with a  for additional information , please call: [phone]           Additional Information About Your Visit        [facility] Information     [facility] gives you secure access to your electronic health record. If you see a primary care provider, you can also send messages to your care team and make appointments. If you have questions, please call your primary care clinic.  If you do not have a primary care provider, please call [phone] and they will assist you.        Care EveryWhere ID     This is your Care EveryWhere ID. This could be used by other organizations to access your [place] medical records  [id number]        Your Vitals Were     Temperature Last Period Breastfeeding?              98.2  F (36.8  C) (Tympanic) 04/05/2013 No          Blood Pressure from Last 3 Encounters:   04/17/17 130/73   10/14/16 128/71   09/20/16 127/75    Weight from Last 3 Encounters:   10/14/16 174 lb (78.9 kg)   09/20/16 178 lb (80.7 kg)   05/06/14 175 lb (79.4 kg)              Today, you had the following     No orders found for display       Primary Care Provider Office Phone # Fax #    Killian Coronel [phone] [address] [place] [address]        Equal Access to Services     TONO ROBBINS : Hadii lindsey cooper Sojuly, waaxda luqadaha, qaybta kaalmada adejoshda, awais zelaya . So [facility] [phone].    ATENCIÓN: Si habla español, tiene a valdes disposición servicios gratuitos de asistencia lingüística. Llame al [phone].    We comply with applicable federal civil rights laws and [place] laws. We do not discriminate on the basis of race, color, national origin, age, disability, sex, sexual orientation, or gender identity.            Thank you!     Thank you for choosing [facility]  for your care. Our goal is always to provide you with excellent care. Hearing back from our patients is one way we can continue to improve our services. Please take a few minutes to complete the written survey that you may receive in the mail after your visit with us. Thank you!             Your Updated Medication List - Protect others around you: Learn how to safely use, store and throw away your medicines at www.disposemymeds.org.          This list is accurate as of: 12/22/17  4:05 PM.  Always use your most recent med list.                   Brand Name Dispense Instructions for use Diagnosis    clonazePAM 0.5 MG tablet    klonoPIN    10 tablet    Take 0.5-1 tablets (0.25-0.5 mg) by mouth 2 times daily as needed for anxiety    Anxiety, Flying phobia       Vitamin D-3 1000 UNITS Caps      Take 1,000 Units by mouth daily Reported on 4/17/2017

## 2017-12-22 NOTE — TELEPHONE ENCOUNTER
"12/22/2017      Patient Communication Preferences indicate  Do not contact  and/or communication by \"Phone\" is not preferred. Call not required per Outreach team.        Outreach ,  Courtney Naranjo     "

## 2017-12-23 ENCOUNTER — MYC MEDICAL ADVICE (OUTPATIENT)
Dept: FAMILY MEDICINE | Facility: CLINIC | Age: 57
End: 2017-12-23

## 2017-12-27 ENCOUNTER — TELEPHONE (OUTPATIENT)
Dept: FAMILY MEDICINE | Facility: CLINIC | Age: 57
End: 2017-12-27

## 2017-12-27 DIAGNOSIS — J18.9 COMMUNITY ACQUIRED PNEUMONIA, UNSPECIFIED LATERALITY: Primary | ICD-10-CM

## 2017-12-27 NOTE — TELEPHONE ENCOUNTER
I have attempted to contact this patient by phone with the following results: left message to return my call on answering machine.    Yesenia Zamora RN

## 2017-12-27 NOTE — TELEPHONE ENCOUNTER
Patient states her cough is not worse but is not getting better at all still from visit on 12/22/2017. States Dr. Coronel said he would try an antibiotic possibly if not getting better. She would like to try that as she said she has never been sick this long. States she is not having shortness of breath so does not want to try the inhaler as that frightens her. States she woke up with a sore throat today, cough is not very productive and will not go away, ear pain/tenderness. Thank you!    Yesenia Zamora RN

## 2017-12-27 NOTE — TELEPHONE ENCOUNTER
Pt is calling in with sore throat and states she has been seen a lot in the past few weeks and is wanting this addressed. Requesting to speak to RN   Ph:[phone]   Please advise     Yaneth Diamond  Clinic Station

## 2017-12-28 RX ORDER — DOXYCYCLINE 100 MG/1
100 CAPSULE ORAL 2 TIMES DAILY
Qty: 20 CAPSULE | Refills: 0 | Status: SHIPPED | OUTPATIENT
Start: 2017-12-28 | End: 2018-11-19

## 2018-07-30 ENCOUNTER — THERAPY VISIT (OUTPATIENT)
Dept: PHYSICAL THERAPY | Facility: CLINIC | Age: 58
End: 2018-07-30
Payer: COMMERCIAL

## 2018-07-30 DIAGNOSIS — M54.50 ACUTE BILATERAL LOW BACK PAIN WITHOUT SCIATICA: Primary | ICD-10-CM

## 2018-07-30 PROCEDURE — 97161 PT EVAL LOW COMPLEX 20 MIN: CPT | Mod: GP | Performed by: PHYSICAL THERAPIST

## 2018-07-30 PROCEDURE — 97110 THERAPEUTIC EXERCISES: CPT | Mod: GP | Performed by: PHYSICAL THERAPIST

## 2018-07-30 NOTE — PROGRESS NOTES
"[place] [facility] Initial Evaluation  Subjective:  Patient is a 58 year old female presenting with rehab back hpi. The history is provided by the patient. No  was used.   Vincent Nelson is a 58 year old female with a lumbar condition.  Condition occurred with:  Bending.  Condition occurred: at home.  This is a new condition  About 9 days ago she was bending forward to put grandson in crib and the following day the pain started.  Started lidocaine patches and Aspercream in a stick but that made it worse. Last visited MD on 7/30/18 for this problem. Took a muscle relaxant prior to eval and reports feeling a little \"loopy\"..    Patient reports pain:  Lumbar spine right and lower lumbar spine.    Pain is described as burning, shooting, stabbing, sharp and other (\"active labor pains\") and is constant (Has periods of increased pain) and reported as 7/10.  Associated symptoms:  Numbness, loss of motion/stiffness, loss of motion and loss of strength. Pain is worse in the P.M..  Symptoms are exacerbated by bending, walking, sitting and standing and relieved by muscle relaxants and heat.  Since onset symptoms are gradually worsening.        General health as reported by patient is good.  Pertinent medical history includes:  Overweight, incontinence and menopausal.  Medical allergies: yes (Asprin-penicillin).    Current medications:  Muscle relaxants and other (Vitamin D).  Current occupation is Retired.        Barriers include:  None as reported by the patient.    Red flags:  Pain at night/rest.                        Objective:  System         Lumbar/SI Evaluation  ROM:    AROM Lumbar:   Flexion:          Down to floor  Ext:                    -50%   Side Bend:        Left:  3\" from knee    Right:  To knee  Rotation:           Left:     Right:   Side Glide:        Left:     Right:                   Neural Tension/Mobility:      Left side:SLR or Slump  negative.   Right side:   SLR " positive.  Right side:   Slump  negative.   Lumbar Palpation:  Palpation (lumbar): Muscle tighness along R paraspinals.    Tenderness not present at Left:    Erector Spinae  Tenderness present at Right: Erector Spinae      Spinal Segmental Conclusions: Decreased mobility and increased tenderness in mid to lower lumbar segments                                                         [place] Lumbar Evaluation        Test Movements:    EIS: During: increases  After: no better  Mechanical Response: no effect      EIL: During: decreases  After: better  Mechanical Response: IncROM              General Evaluation:                        Posture:      Sitting:  Rounded shoulders and forward head                                           ROS   Patient reports she is in severe pain but moves with minimal to pain behavior except when rolling and transitioning on table.    Assessment/Plan:    Patient is a 58 year old female with lumbar complaints.    Patient has the following significant findings with corresponding treatment plan.                Diagnosis 1:  Left side low back pain  Pain -  hot/cold therapy, self management, education and home program  Decreased ROM/flexibility - manual therapy, therapeutic exercise and home program  Decreased joint mobility - manual therapy, therapeutic exercise and home program  Decreased strength - therapeutic exercise, therapeutic activities and home program  Impaired muscle performance - neuro re-education and home program  Decreased function - therapeutic activities and home program    Therapy Evaluation Codes:   1) History comprised of:   Personal factors that impact the plan of care:      Age and Anxiety.    Comorbidity factors that impact the plan of care are:      Menopausal, Overweight, Pain at night/rest and Incontinence.     Medications impacting care: Muscle relaxant.  2) Examination of Body Systems comprised of:   Body structures and functions that impact the plan of care:       Lumbar spine.   Activity limitations that impact the plan of care are:      Bending, Sitting, Squatting/kneeling, Stairs, Standing, Walking, Sleeping and Laying down.  3) Clinical presentation characteristics are:   Stable/Uncomplicated.  4) Decision-Making    Low complexity using standardized patient assessment instrument and/or measureable assessment of functional outcome.  Cumulative Therapy Evaluation is: Low complexity.    Previous and current functional limitations:  (See Goal Flow Sheet for this information)    Short term and Long term goals: (See Goal Flow Sheet for this information)     Communication ability:  Patient appears to be able to clearly communicate and understand verbal and written communication and follow directions correctly.  Treatment Explanation - The following has been discussed with the patient:   RX ordered/plan of care  Anticipated outcomes  Possible risks and side effects  This patient would benefit from PT intervention to resume normal activities.   Rehab potential is good.    Frequency:  1 X week, once daily  Duration:  for 8 weeks  Discharge Plan:  Achieve all LTG.  Independent in home treatment program.  Reach maximal therapeutic benefit.    Please refer to the daily flowsheet for treatment today, total treatment time and time spent performing 1:1 timed codes.

## 2018-07-30 NOTE — LETTER
"[facility] PHYSICAL THERAPY  [address] [address]  [phone]    2018    Re: Vincent Nelson   :   1960  MRN:  [id number]   REFERRING PHYSICIAN:   Everton Pisano    [facility] PHYSICAL THERAP[facility]    Date of Initial Evaluation:  2018  Visits:  Rxs Used: 1  Reason for Referral:  Acute bilateral low back pain without sciatica    EVALUATION SUMMARY    [place] [facility] Initial Evaluation  Subjective:  Patient is a 58 year old female presenting with rehab back hpi. The history is provided by the patient. No  was used. Vincent Nelson is a 58 year old female with a lumbar condition.  Condition occurred with:  Bending.  Condition occurred: at home.  This is a new condition  About 9 days ago she was bending forward to put grandson in crib and the following day the pain started.  Started lidocaine patches and Aspercream in a stick but that made it worse. Last visited MD on 18 for this problem. Took a muscle relaxant prior to eval and reports feeling a little \"loopy\"..  Patient reports pain:  Lumbar spine right and lower lumbar spine.    Pain is described as burning, shooting, stabbing, sharp and other (\"active labor pains\") and is constant (Has periods of increased pain) and reported as 7/10.  Associated symptoms:  Numbness, loss of motion/stiffness, loss of motion and loss of strength. Pain is worse in the P.M..  Symptoms are exacerbated by bending, walking, sitting and standing and relieved by muscle relaxants and heat.  Since onset symptoms are gradually worsening.        General health as reported by patient is good.  Pertinent medical history includes:  Overweight, incontinence and menopausal.  Medical allergies: yes (Asprin-penicillin).    Current medications:  Muscle relaxants and other (Vitamin D).  Current occupation is Retired.    Barriers include:  None as reported by the patient. Red flags:  Pain at " "night/rest.    Objective:  System  Lumbar/SI Evaluation  ROM:    AROM Lumbar:   Flexion:          Down to floor  Ext:                    -50%   Side Bend:        Left:  3\" from knee    Right:  To knee  Rotation:           Left:     Right:   Side Glide:        Left:     Right:   Neural Tension/Mobility:    Left side:SLR or Slump  negative.   Right side:   SLR positive.  Right side:   Slump  negative.   Lumbar Palpation:  Palpation (lumbar): Muscle tighness along R paraspinals.  Tenderness not present at Left:    Erector Spinae  Tenderness present at Right: Erector Spinae  Spinal Segmental Conclusions: Decreased mobility and increased tenderness in mid to lower lumbar segments  [place] Lumbar Evaluation  Test Movements:  EIS: During: increases  After: no better  Mechanical Response: no effect  EIL: During: decreases  After: better  Mechanical Response: IncROM  General Evaluation:  Posture:    Sitting:  Rounded shoulders and forward head  ROS   Patient reports she is in severe pain but moves with minimal to pain behavior except when rolling and transitioning on table.    Assessment/Plan:    Patient is a 58 year old female with lumbar complaints.    Patient has the following significant findings with corresponding treatment plan.                Diagnosis 1:  Left side low back pain  Pain -  hot/cold therapy, self management, education and home program  Decreased ROM/flexibility - manual therapy, therapeutic exercise and home program  Decreased joint mobility - manual therapy, therapeutic exercise and home program  Decreased strength - therapeutic exercise, therapeutic activities and home program  Impaired muscle performance - neuro re-education and home program  Decreased function - therapeutic activities and home program  Therapy Evaluation Codes:   1) History comprised of:   Personal factors that impact the plan of care:      Age and Anxiety.    Comorbidity factors that impact the plan of care are:      Menopausal, " Overweight, Pain at night/rest and Incontinence.     Medications impacting care: Muscle relaxant.  2) Examination of Body Systems comprised of:   Body structures and functions that impact the plan of care:      Lumbar spine.   Activity limitations that impact the plan of care are:      Bending, Sitting, Squatting/kneeling, Stairs, Standing, Walking, Sleeping and  Laying down.  3) Clinical presentation characteristics are:   Stable/Uncomplicated.  4) Decision-Making    Low complexity using standardized patient assessment instrument and/or  measureable assessment of functional outcome.  Cumulative Therapy Evaluation is: Low complexity.  Previous and current functional limitations:  (See Goal Flow Sheet for this information)    Short term and Long term goals: (See Goal Flow Sheet for this information)   Communication ability:  Patient appears to be able to clearly communicate and understand verbal and written communication and follow directions correctly.  Treatment Explanation - The following has been discussed with the patient:   RX ordered/plan of care  Anticipated outcomes  Possible risks and side effects  This patient would benefit from PT intervention to resume normal activities.   Rehab potential is good.  Frequency:  1 X week, once daily  Duration:  for 8 weeks  Discharge Plan:  Achieve all LTG.  Independent in home treatment program.  Reach maximal therapeutic benefit.    Thank you for your referral.    INQUIRIES  Therapist: PAZ Miramontes [facility] PHYSICAL THERAPY  [address] [address]  Phone: [phone]  Fax: [phone]

## 2018-08-02 ENCOUNTER — THERAPY VISIT (OUTPATIENT)
Dept: PHYSICAL THERAPY | Facility: CLINIC | Age: 58
End: 2018-08-02
Payer: COMMERCIAL

## 2018-08-02 DIAGNOSIS — M54.50 ACUTE BILATERAL LOW BACK PAIN WITHOUT SCIATICA: ICD-10-CM

## 2018-08-02 PROCEDURE — 97110 THERAPEUTIC EXERCISES: CPT | Mod: GP | Performed by: PHYSICAL THERAPIST

## 2018-08-02 PROCEDURE — 97140 MANUAL THERAPY 1/> REGIONS: CPT | Mod: GP | Performed by: PHYSICAL THERAPIST

## 2018-10-01 PROBLEM — M54.50 ACUTE BILATERAL LOW BACK PAIN WITHOUT SCIATICA: Status: RESOLVED | Noted: 2018-07-30 | Resolved: 2018-10-01

## 2018-10-01 NOTE — PROGRESS NOTES
Subjective:  HPI                    Objective:  System    Physical Exam    General     ROS    Assessment/Plan:    DISCHARGE REPORT    Progress reporting period is from 8/2/18 to 10/1/18.       SUBJECTIVE  Subjective changes noted by patient: Pt reports that on Ciro pain was really bad but since then, she's been doing OK iwth the exercises and the pain is starting to simmer down. During the day, the pain is fairly managable and doing OK however at night, the pain tends to be worse and is taking the muscle relaxer. Pain is out of the blue at night and is high intensity. Sleeps on her L side.      Initial Pain level: 7/10.   Changes in function:  Unknown as patient did not return for final assessment.    Adverse reaction to treatment or activity: None    OBJECTIVE  Changes noted in objective findings:  Patient has failed to return to therapy so current objective findings are unknown.  Objective: Moderate tightness noted in mid/lower R paraspinals.     ASSESSMENT/PLAN  Updated problem list and treatment plan: Diagnosis 1:  LBP  Pain -  hot/cold therapy, manual therapy, self management, education and home program  Decreased ROM/flexibility - manual therapy, therapeutic exercise, therapeutic activity and home program  Decreased strength - therapeutic exercise, therapeutic activities and home program  Impaired muscle performance - neuro re-education and home program  Decreased function - therapeutic activities and home program  Impaired posture - neuro re-education, therapeutic activities and home program  STG/LTGs have been met or progress has been made towards goals:  ,Unknown as patient did not return for final assessment.    Assessment of Progress: The patient has not returned to therapy. Current status is unknown.  Self Management Plans:  Patient has been instructed in a home treatment program.  Patient  has been instructed in self management of symptoms.    Vincent continues to require the following intervention to  meet STG and LTG's:  PT intervention is no longer required to meet STG/LTG.    Recommendations:  discontinue as patient did not follow up.    Please refer to the daily flowsheet for treatment today, total treatment time and time spent performing 1:1 timed codes.

## 2018-10-04 ENCOUNTER — TRANSFERRED RECORDS (OUTPATIENT)
Dept: HEALTH INFORMATION MANAGEMENT | Facility: CLINIC | Age: 58
End: 2018-10-04

## 2018-10-14 ENCOUNTER — HEALTH MAINTENANCE LETTER (OUTPATIENT)
Age: 58
End: 2018-10-14

## 2018-11-19 ENCOUNTER — OFFICE VISIT (OUTPATIENT)
Dept: FAMILY MEDICINE | Facility: CLINIC | Age: 58
End: 2018-11-19
Payer: COMMERCIAL

## 2018-11-19 VITALS — OXYGEN SATURATION: 98 % | HEART RATE: 102 BPM | TEMPERATURE: 98.4 F

## 2018-11-19 DIAGNOSIS — F41.0 PANIC DISORDER: ICD-10-CM

## 2018-11-19 DIAGNOSIS — J20.9 ACUTE BRONCHITIS WITH SYMPTOMS > 10 DAYS: Primary | ICD-10-CM

## 2018-11-19 PROCEDURE — 99213 OFFICE O/P EST LOW 20 MIN: CPT | Performed by: FAMILY MEDICINE

## 2018-11-19 RX ORDER — AZITHROMYCIN 250 MG/1
TABLET, FILM COATED ORAL
Qty: 6 TABLET | Refills: 0 | Status: SHIPPED | OUTPATIENT
Start: 2018-11-19

## 2018-11-19 NOTE — MR AVS SNAPSHOT
After Visit Summary   11/19/2018    Vincent Nelson    MRN: [id number]           Patient Information     Date Of Birth          1960        Visit Information        Provider Department      11/19/2018 10:00 AM Tegan Mariee MD [facility]        Today's Diagnoses     Acute bronchitis with symptoms > 10 days    -  1    Panic disorder          Care Instructions      Bronchitis, Antibiotic Treatment (Adult)    Bronchitis is an infection of the air passages (bronchial tubes) in your lungs. It often occurs when you have a cold. This illness is contagious during the first few days and is spread through the air by coughing and sneezing, or by direct contact (touching the sick person and then touching your own eyes, nose, or mouth).  Symptoms of bronchitis include cough with mucus (phlegm) and low-grade fever. Bronchitis usually lasts 7 to 14 days. Mild cases can be treated with simple home remedies. More severe infection is treated with an antibiotic.  Home care  Follow these guidelines when caring for yourself at home:    If your symptoms are severe, rest at home for the first 2 to 3 days. When you go back to your usual activities, don't let yourself get too tired.    Don't smoke. Also stay away from secondhand smoke.    You may use over-the-counter medicines to control fever or pain, unless another medicine was prescribed. If you have chronic liver or kidney disease or have ever had a stomach ulcer or gastrointestinal bleeding, talk with your healthcare provider before using these medicines. Also talk to your provider if you are taking medicine to prevent blood clots. Aspirin should never be given to anyone younger than 18 who is ill with a viral infection or fever. It may cause severe liver or brain damage.    Your appetite may be low, so a light diet is fine. Stay well hydrated by drinking 6 to 8 glasses of fluids per day. This includes water, soft drinks, sports drinks, juices, tea, or  soup. Extra fluids will help loosen mucus in your nose and lungs.    Over-the-counter cough, cold, and sore-throat medicines will not shorten the length of the illness, but they may be helpful to reduce your symptoms. Don't use decongestants if you have high blood pressure.    Finish all antibiotic medicine. Do this even if you are feeling better after only a few days.  Follow-up care  Follow up with your healthcare provider, or as advised. If you had an X-ray or ECG (electrocardiogram), a specialist will review it. You will be told of any new test results that may affect your care.  If you are age 65 or older, if you smoke, or if you have a chronic lung disease or condition that affects your immune system, ask your healthcare provider about getting a pneumococcal vaccine and a yearly flu shot (influenza vaccine).  When to seek medical advice  Call your healthcare provider right away if any of these occur:    Fever of 100.4 F (38 C) or higher, or as directed by your healthcare provider    Coughing up more sputum    Weakness, drowsiness, headache, facial pain, ear pain, or a stiff neck     Call 911  Call 911 if any of these occur.    Coughing up blood    Weakness, drowsiness, headache, or stiff neck that get worse    Trouble breathing, wheezing, or pain with breathing   Date Last Reviewed: 6/1/2018 2000-2018 [email or web address]. [address] [place] [address]. All rights reserved. This information is not intended as a substitute for professional medical care. Always follow your healthcare professional's instructions.                Follow-ups after your visit        Follow-up notes from your care team     Return if symptoms worsen or fail to improve.      Who to contact     Normal or non-critical lab and imaging results will be communicated to you by MyChart, letter or phone within 4 business days after the clinic has received the results. If you do not hear from us within 7 days, please contact  the clinic through [employer] or phone. If you have a critical or abnormal lab result, we will notify you by phone as soon as possible.  Submit refill requests through [employer] or call your pharmacy and they will forward the refill request to us. Please allow 3 business days for your refill to be completed.          If you need to speak with a  for additional information , please call: [phone]             Additional Information About Your Visit        [employer] Information     [employer] gives you secure access to your electronic health record. If you see a primary care provider, you can also send messages to your care team and make appointments. If you have questions, please call your primary care clinic.  If you do not have a primary care provider, please call [phone] and they will assist you.        Care EveryWhere ID     This is your Care EveryWhere ID. This could be used by other organizations to access your [facility] medical records  [id number]        Your Vitals Were     Pulse Temperature Last Period Pulse Oximetry Breastfeeding?       102 98.4  F (36.9  C) (Tympanic) 04/05/2013 98% No        Blood Pressure from Last 3 Encounters:   04/17/17 130/73   10/14/16 128/71   09/20/16 127/75    Weight from Last 3 Encounters:   10/14/16 174 lb (78.9 kg)   09/20/16 178 lb (80.7 kg)   05/06/14 175 lb (79.4 kg)              Today, you had the following     No orders found for display         Today's Medication Changes          These changes are accurate as of 11/19/18 10:06 AM.  If you have any questions, ask your nurse or doctor.               Start taking these medicines.        Dose/Directions    azithromycin 250 MG tablet   Commonly known as:  ZITHROMAX   Used for:  Acute bronchitis with symptoms > 10 days   Started by:  Tegan Mariee MD        Two tablets first day, then one tablet daily for four days.   Quantity:  6 tablet   Refills:  0            Where to get your medicines      These  medications were sent to [facility]/pharmacy #[id number] - [place], [place] - [address]  [address], [place] [address]     Phone:  [phone]     azithromycin 250 MG tablet                Primary Care Provider Office Phone # Fax #    Booker Boni Oliveira [phone]       [facility] [address]  [facility] [place] [address]        Equal Access to Services     [facility] [place] [facility] HospitalLESLIE : Hadii aad ku hadasho Soomaali, waaxda luqadaha, qaybta kaalmada adeegyada, waxay idiin hayaan adeeg kharash laarsh . So [facility] [phone].    ATENCIÓN: Si habla español, tiene a valdes disposición servicios gratuitos de asistencia lingüística. [facility] [phone].    We comply with applicable federal civil rights laws and [place] laws. We do not discriminate on the basis of race, color, national origin, age, disability, sex, sexual orientation, or gender identity.            Thank you!     Thank you for choosing [facility]  for your care. Our goal is always to provide you with excellent care. Hearing back from our patients is one way we can continue to improve our services. Please take a few minutes to complete the written survey that you may receive in the mail after your visit with us. Thank you!             Your Updated Medication List - Protect others around you: Learn how to safely use, store and throw away your medicines at www.disposemymeds.org.          This list is accurate as of 11/19/18 10:06 AM.  Always use your most recent med list.                   Brand Name Dispense Instructions for use Diagnosis    azithromycin 250 MG tablet    ZITHROMAX    6 tablet    Two tablets first day, then one tablet daily for four days.    Acute bronchitis with symptoms > 10 days       clonazePAM 0.5 MG tablet    klonoPIN    10 tablet    Take 0.5-1 tablets (0.25-0.5 mg) by mouth 2 times daily as needed for anxiety    Anxiety, Flying phobia       Vitamin  D-3 1000 units Caps      Take 1,000 Units by mouth daily Reported on 4/17/2017

## 2018-11-19 NOTE — PROGRESS NOTES
SUBJECTIVE:  Vincent Nelson is a 58 year old female who presents with the following concerns;              Symptoms: cc Present Absent Comment   Fever/Chills  x  Chills    Fatigue  x     Muscle Aches  x     Eye Irritation   x    Sneezing   x    Nasal John/Drg   x    Sinus Pressure/Pain   x    Loss of smell   x    Dental pain   x    Sore Throat  x  Sore throat is from coughing    Swollen Glands   x    Ear Pain/Fullness   x Ear pain improved   Cough  x  Dry cough    Wheeze   x    Chest Pain   x    Shortness of breath   x    Rash   x    Other  x  Slight headache      Symptom duration:  x3 weeks   Sympom severity:  moderate   Treatments tried:  none today    Contacts:  son had pneumonia      Patient reports that she has a panic disorder. Declines her blood pressure being checked in clinic today. States that she checked it at home and it was 130/70 at home before she came in today.     Medications updated and reviewed.  Current Outpatient Prescriptions   Medication     azithromycin (ZITHROMAX) 250 MG tablet     Cholecalciferol (VITAMIN D-3) 1000 UNITS CAPS     clonazePAM (KLONOPIN) 0.5 MG tablet     No current facility-administered medications for this visit.        Past, family and surgical history is updated and reviewed in the record.    ROS:  Other than noted above, general, HEENT, respiratory, cardiac and gastrointestinal systems are negative.    OBJECTIVE:  Pulse 102  Temp 98.4  F (36.9  C) (Tympanic)  LMP 04/05/2013  SpO2 98%  Breastfeeding? No  GENERAL:  Alert, no acute distress  EYES:  PERRL, EOM normal, conjunctiva and lids normal  HEENT:  Ears and TMs normal, oral mucosa and posterior oropharynx normal  RESP:  Lungs clear to auscultation. No wheezes or crackles. Normal respiratory effort.   CV: normal rate, regular rhythm, no murmur or gallop.    DATA  None    Assessment/Plan:   Vincent was seen today for cough.    Diagnoses and all orders for this visit:    Acute bronchitis with symptoms > 10 days  -      azithromycin (ZITHROMAX) 250 MG tablet; Two tablets first day, then one tablet daily for four days.    Panic disorder  Declines her blood pressure to be checked here today, states that it was 130/70    Patient education and Handout given       Follow up if symptoms fail to improve or worsen. Patient encouraged to follow up with her PCP and bring in her home BP machine.     The patient was in agreement with the plan today and had no questions or concerns prior to leaving the clinic.      Tegan Mariee M.D    [facility]

## 2018-11-19 NOTE — PATIENT INSTRUCTIONS
Bronchitis, Antibiotic Treatment (Adult)    Bronchitis is an infection of the air passages (bronchial tubes) in your lungs. It often occurs when you have a cold. This illness is contagious during the first few days and is spread through the air by coughing and sneezing, or by direct contact (touching the sick person and then touching your own eyes, nose, or mouth).  Symptoms of bronchitis include cough with mucus (phlegm) and low-grade fever. Bronchitis usually lasts 7 to 14 days. Mild cases can be treated with simple home remedies. More severe infection is treated with an antibiotic.  Home care  Follow these guidelines when caring for yourself at home:    If your symptoms are severe, rest at home for the first 2 to 3 days. When you go back to your usual activities, don't let yourself get too tired.    Don't smoke. Also stay away from secondhand smoke.    You may use over-the-counter medicines to control fever or pain, unless another medicine was prescribed. If you have chronic liver or kidney disease or have ever had a stomach ulcer or gastrointestinal bleeding, talk with your healthcare provider before using these medicines. Also talk to your provider if you are taking medicine to prevent blood clots. Aspirin should never be given to anyone younger than 18 who is ill with a viral infection or fever. It may cause severe liver or brain damage.    Your appetite may be low, so a light diet is fine. Stay well hydrated by drinking 6 to 8 glasses of fluids per day. This includes water, soft drinks, sports drinks, juices, tea, or soup. Extra fluids will help loosen mucus in your nose and lungs.    Over-the-counter cough, cold, and sore-throat medicines will not shorten the length of the illness, but they may be helpful to reduce your symptoms. Don't use decongestants if you have high blood pressure.    Finish all antibiotic medicine. Do this even if you are feeling better after only a few days.  Follow-up care  Follow  up with your healthcare provider, or as advised. If you had an X-ray or ECG (electrocardiogram), a specialist will review it. You will be told of any new test results that may affect your care.  If you are age 65 or older, if you smoke, or if you have a chronic lung disease or condition that affects your immune system, ask your healthcare provider about getting a pneumococcal vaccine and a yearly flu shot (influenza vaccine).  When to seek medical advice  Call your healthcare provider right away if any of these occur:    Fever of 100.4 F (38 C) or higher, or as directed by your healthcare provider    Coughing up more sputum    Weakness, drowsiness, headache, facial pain, ear pain, or a stiff neck     Call 911  Call 911 if any of these occur.    Coughing up blood    Weakness, drowsiness, headache, or stiff neck that get worse    Trouble breathing, wheezing, or pain with breathing   Date Last Reviewed: 6/1/2018 2000-2018 [employer]. [address] [place] [address], [place] [id number]. All rights reserved. This information is not intended as a substitute for professional medical care. Always follow your healthcare professional's instructions.

## 2019-05-07 NOTE — TELEPHONE ENCOUNTER
"I called Vincent back and she said she is having watery diarrhea and wants to talk to Dr Coronel. She is taking Ceftin for a UTI and said she has two days left.     I recommended a probiotic and Vincent became upset and said \"I am not taking any more pills and I did not call to talk to you. I want the message to go to Dr Coronel!\"    The patient is eating yogurt. Glendy Davis RN    " Last visit 1/25/19  Next Visit Date:  Future Appointments   Date Time Provider Michael Ribeiroi   5/24/2019  1:00 PM Ludy Newby  [facility]   Topic Date Due    Breast cancer screen  05/10/2018    Shingles Vaccine (2 of 3) 06/19/2019 (Originally 3/25/2014)    TSH testing  11/20/2019    Lipid screen  11/20/2023    Colon cancer screen colonoscopy  11/07/2024    DTaP/Tdap/Td vaccine (2 - Td) 01/19/2025    DEXA (modify frequency per FRAX score)  Completed    Flu vaccine  Completed    Pneumococcal 65+ years Vaccine  Completed    Hepatitis C screen  Completed       No results found for: LABA1C          ( goal A1C is < 7)   No results found for: LABMICR  LDL Cholesterol (mg/dL)   Date Value   11/20/2018 52   08/02/2018 169 (H)       (goal LDL is <100)   AST (U/L)   Date Value   11/20/2018 20     ALT (U/L)   Date Value   11/20/2018 22     BUN (mg/dL)   Date Value   11/20/2018 10     BP Readings from Last 3 Encounters:   01/25/19 114/80   10/19/18 116/74   06/19/18 112/70          (goal 120/80)    All Future Testing planned in [facility]  Lab Frequency Next Occurrence   US Renal Kidney Once 03/29/2019               Patient Active Problem List:     Coronary artery disease involving native coronary artery of native heart without angina pectoris     Hypercholesteremia     Hearing loss     Hiatal hernia     Proximal humerus fracture     Acquired hypothyroidism

## 2019-11-05 ENCOUNTER — HEALTH MAINTENANCE LETTER (OUTPATIENT)
Age: 59
End: 2019-11-05

## 2020-11-22 ENCOUNTER — HEALTH MAINTENANCE LETTER (OUTPATIENT)
Age: 60
End: 2020-11-22

## 2021-01-15 ENCOUNTER — HEALTH MAINTENANCE LETTER (OUTPATIENT)
Age: 61
End: 2021-01-15

## 2021-03-19 ENCOUNTER — IMMUNIZATION (OUTPATIENT)
Dept: NURSING | Facility: CLINIC | Age: 61
End: 2021-03-19
Payer: COMMERCIAL

## 2021-03-19 PROCEDURE — 91300 PR COVID VAC PFIZER DIL RECON 30 MCG/0.3 ML IM: CPT

## 2021-03-19 PROCEDURE — 0001A PR COVID VAC PFIZER DIL RECON 30 MCG/0.3 ML IM: CPT

## 2021-04-09 ENCOUNTER — IMMUNIZATION (OUTPATIENT)
Dept: PEDIATRICS | Facility: CLINIC | Age: 61
End: 2021-04-09
Attending: FAMILY MEDICINE
Payer: COMMERCIAL

## 2021-04-09 PROCEDURE — 0002A PR COVID VAC PFIZER DIL RECON 30 MCG/0.3 ML IM: CPT

## 2021-04-09 PROCEDURE — 91300 PR COVID VAC PFIZER DIL RECON 30 MCG/0.3 ML IM: CPT

## 2021-09-19 ENCOUNTER — HEALTH MAINTENANCE LETTER (OUTPATIENT)
Age: 61
End: 2021-09-19

## 2022-01-09 ENCOUNTER — HEALTH MAINTENANCE LETTER (OUTPATIENT)
Age: 62
End: 2022-01-09

## 2022-11-21 ENCOUNTER — HEALTH MAINTENANCE LETTER (OUTPATIENT)
Age: 62
End: 2022-11-21

## 2023-04-16 ENCOUNTER — HEALTH MAINTENANCE LETTER (OUTPATIENT)
Age: 63
End: 2023-04-16

## 2023-11-27 ENCOUNTER — TRANSFERRED RECORDS (OUTPATIENT)
Dept: HEALTH INFORMATION MANAGEMENT | Facility: CLINIC | Age: 63
End: 2023-11-27
Payer: COMMERCIAL

## 2024-06-21 ENCOUNTER — NURSE TRIAGE (OUTPATIENT)
Dept: URGENT CARE | Facility: URGENT CARE | Age: 64
End: 2024-06-21
Payer: COMMERCIAL

## 2024-06-21 NOTE — TELEPHONE ENCOUNTER
Nurse Triage SBAR    Is this a 2nd Level Triage? NO    Situation: Hit head on 6/19/24    Background: Pt states she was shopping, she bent down and when she stood up and hit her forehead on a metal bar. Pt experienced a mild headache and neck pain.     Assessment: pt having a mild headache, 4.5- 5/10,  and neck pain following hit to the head. No lump or bump on forehead. No loss of consciousness, vision change, nausea, vomiting, confusion. Took acetaminophen and was helpful for discomfort. Applying ice to the neck. Pt slept fine last night; woke up feeling back to normal this morning.     Protocol Recommended Disposition:   No disposition on file.    Recommendation: Pt declined scheduling appointment for evaluation. Pt will call back if questions, symptoms. Discussed when to seek medical care.    Pt will monitor symptoms, use acetaminophen, ice and heat and if not improved on Monday, will schedule OV.       Does the patient meet one of the following criteria for ADS visit consideration? No   Additional Information   Negative: ACUTE NEUROLOGIC SYMPTOM and symptom present now   Negative: Knocked out (unconscious) > 1 minute   Negative: Seizure (convulsion) occurred  (Exception: Prior history of seizures and now alert and without Acute Neurologic Symptoms.)   Negative: Neck pain after dangerous injury (e.g., MVA, diving, trampoline, contact sports, fall > 10 feet or 3 meters)  (Exception: Neck pain began > 1 hour after injury.)   Negative: Major bleeding (actively dripping or spurting) that can't be stopped   Negative: Penetrating head injury (e.g., knife, gunshot wound, metal object)   Negative: Sounds like a life-threatening emergency to the triager   Negative: Diagnosed with a concussion within last 14 days   Negative: Can't remember what happened (amnesia)   Negative: Vomiting once or more   Negative: Watery or blood-tinged fluid dripping from the nose or ears   Negative: ACUTE NEUROLOGIC SYMPTOM and now fine    Negative: Knocked out (unconscious) < 1 minute and now fine   Negative: SEVERE headache   Negative: Dangerous injury (e.g., MVA, diving, trampoline, contact sports, fall > 10 feet or 3 meters) or severe blow from hard object (e.g., golf club or baseball bat)   Negative: Large swelling or bruise (> 2 inches or 5 cm)   Negative: Skin is split open or gaping (length > 1/2 inch or 12 mm)   Negative: Bleeding won't stop after 10 minutes of direct pressure (using correct technique)   Negative: One or two 'black eyes' (bruising, purple color of eyelids), and onset within 24 hours of head injury   Negative: Taking Coumadin (warfarin) or other strong blood thinner, or known bleeding disorder (e.g., thrombocytopenia)   Negative: Age over 65 years with an area of head swelling or bruise   Negative: Sounds like a serious injury to the triager   Negative: Patient is confused or is an unreliable provider of information (e.g., dementia, severe intellectual disability, alcohol intoxication)   Negative: No prior tetanus shots (or is not fully vaccinated) and any wound (e.g., cut or scrape)   Negative: HIV positive or severe immunodeficiency (severely weak immune system) and DIRTY cut or scrape   Negative: Patient wants to be seen    Protocols used: Head Injury-A-OH

## 2024-06-22 ENCOUNTER — HEALTH MAINTENANCE LETTER (OUTPATIENT)
Age: 64
End: 2024-06-22

## 2024-08-14 ENCOUNTER — TRANSFERRED RECORDS (OUTPATIENT)
Dept: HEALTH INFORMATION MANAGEMENT | Facility: CLINIC | Age: 64
End: 2024-08-14
Payer: COMMERCIAL

## 2024-09-10 ENCOUNTER — TRANSFERRED RECORDS (OUTPATIENT)
Dept: HEALTH INFORMATION MANAGEMENT | Facility: CLINIC | Age: 64
End: 2024-09-10
Payer: COMMERCIAL